# Patient Record
(demographics unavailable — no encounter records)

---

## 2024-12-11 NOTE — HISTORY OF PRESENT ILLNESS
[FreeTextEntry1] : Follow up. [de-identified] : Patient is a 69yo female with PMH HLD, IFG, MARCY, osteoporosis, thyroid nodules who presents to the office for follow up.   HLD:  , ASCVD risk 8% in 06/2024.  Recommended statin versus increased diet/exercise efforts, pt deferred statin at that time.   Fasting glucose 93, A1C 5.7% in 06/2024.  Lifestyle modifications encouraged.   MCV elevated, B12 WNL in 06/2024.   Cardiology:  Dr. Altamirano.   Hx MARCY, is on CPAP (but does not tolerate well).  Sleep specialist considering BiPAP.  Hx osteoporosis previously on Risendronate.  Has been off x1 year.  Has f/u upcoming with specialist from [facility], Dr. Lorenzo.

## 2024-12-11 NOTE — ASSESSMENT
[FreeTextEntry1] : Patient is a 69yo female with PMH HLD, IFG, AMRCY, osteoporosis, thyroid nodules who presents to the office for follow up.  HLD - Pt not fasting today, RX for b/w provided. - Limit/avoid greasy foods, fried foods, fatty foods, and saturated fat in your diet and try and eat more lean proteins. - Try and incorporate 30 minutes of aerobic exercise to your daily routine.  Hx MARCY, currently on CPAP, following with sleep specialist.  Pt also following with [facility], Dr. Lorenzo, for osteoporosis.  Has been off Risendronate x1 year.  Hx multiple thyroid nodules, most recent scan 08/2024.  Plan to rpt in 1 year.  Call the office or go to the ED immediately if you develop new, worsening or concerning symptoms including high fever, severe headache/worst headache of your life, confusion, dizziness/lightheadedness, loss of consciousness, severe chest pain, difficulty breathing, shortness of breath, severe abdominal pain, excessive vomiting/diarrhea, inability to feel/move the extremities, or any other concerning symptoms.

## 2024-12-11 NOTE — HEALTH RISK ASSESSMENT
[Yes] : Yes [2 - 3 times a week (3 pts)] : 2 - 3  times a week (3 points) [1 or 2 (0 pts)] : 1 or 2 (0 points) [Never (0 pts)] : Never (0 points) [No] : In the past 12 months have you used drugs other than those required for medical reasons? No [No falls in past year] : Patient reported no falls in the past year [0] : 2) Feeling down, depressed, or hopeless: Not at all (0) [PHQ-2 Negative - No further assessment needed] : PHQ-2 Negative - No further assessment needed [Former] : Former [> 15 Years] : > 15 Years [Audit-CScore] : 3 [de-identified] : marijuana, gummies at night as needed [de-identified] : exercises 4x/wk pilates [de-identified] : room for improvement; is good sometimes but sometimes bad [[id number]] : 0

## 2025-05-23 NOTE — ASSESSMENT
[FreeTextEntry1] : skin erosion possible traumatic given few weeks, trial of wound care (vaseline/mupirocin) under a bandaid reassess in 6 weeks - if not resolving, biopsy  Actinic keratoses, right nasal side wall  - Natural history reviewed including small risk of transformation to squamous cell carcinoma over time  - Patient instructed to call for re-evaluation if lesion does not resolve in 4-6 weeks Procedure note: Cryotherapy  Verbal consent obtained. Risks/benefits/alternatives discussed including but not limited to risk of pain, inflammatory and blistering reaction, infection, risk of permanent scar and/or dyspigmentation, recurrence, possible lack of efficacy and need for repeat treatments. Site confirmed.  lesion(s) treated with cryotherapy: liquid nitrogen x 2 rapid freeze-slow thaw cycles. Discussed wound care instructions. Patient tolerated well with no immediate complications.  Benign appearing nevi Cherry angiomas Seborrheic keratoses - benign, reassurance, no intervention needed unless irritated   - TBSE performed today - as above - TBSE every year with dermatologist - Photoprotection reviewed including sun-protective behaviors, protective clothing, and the use of OTC broad-spectrum SPF 30+ sunscreens was advised - RTC if develops lesions that are new, symptomatic (bleeding/itching), changing in size/color/shape particularly in areas of prior sun exposure (face, hands, chest)

## 2025-05-23 NOTE — HISTORY OF PRESENT ILLNESS
[FreeTextEntry1] : skin growth  [de-identified] : hx of AK on nose biopsied by prior dermatologist family hx of NMSC  has rough spot on right aspect of nose also with open sore on left chest for few weeks

## 2025-05-23 NOTE — PHYSICAL EXAM
[FreeTextEntry3] : AAOx3, NAD, well-appearing / pleasant Total body skin exam performed with the exception of:  limited genital/groin evaluation, limited scalp evaluation All examined areas within normal limits with the exception of  stuck on brown papules on trunk and extremities hyperkeratotic papule on right nasal sidewall brown macules and papules on trunk and extremities with no concerning features on dermoscopy erosion on left chest red papules on trunk and extremities

## 2025-07-09 NOTE — HISTORY OF PRESENT ILLNESS
[FreeTextEntry1] : CHAIM GARCES is a 69 year old female here for a physical exam.  [de-identified] : Her last physical exam was last year  Vaccines: Tetanus is up to date, Tdap 4/2019 Pneumococcal vaccination is up to date Shingrix is up to date  Her last dentist visit was less than one year ago Her last eye doctor appointment was less than one year ago Her last dermatologist visit was less than one year ago, 5/2025 Dr. Rocha  GYN visit is up to date, 9/2024 Dr. Houston Mammogram is up to date, 10/2024, stable/benign mammo/US Colon cancer screening is up to date, colonoscopy 10/12/2022, rpt due at 5yrs (10/2027), Dr. Kline DEXA is up to date, Later 2024/early 2025 showed -3.1 fem neck (decrease of 2.4% from prior study). Given worsening osteoporosis, was off Risedronate therapy as of 1/2024, followed by Dr. Lorenzo [facility]. She started Prolia Rx with Dr. Lorenzo in Early 2025, next dose due in Aug 2025.  is noticing jaw discomfort and wonders if is due to Prolia and will be f/u with Dr. Lorenzo on that. Plans Prolia x 1-2 yrs and then transition to Reclast Rx.   Her diet is healthy overall Exercise: Pilates approx 4d/wk, trying to work in more days.   Daughter had twins Oct 2024, two girls, Shane Valdez and Maryjaen Navarro

## 2025-07-09 NOTE — PHYSICAL EXAM
[No Acute Distress] : no acute distress [Well Nourished] : well nourished [Well Developed] : well developed [Well-Appearing] : well-appearing [Normal Sclera/Conjunctiva] : normal sclera/conjunctiva [EOMI] : extraocular movements intact [Normal Outer Ear/Nose] : the outer ears and nose were normal in appearance [Normal Oropharynx] : the oropharynx was normal [Normal TMs] : both tympanic membranes were normal [Normal Nasal Mucosa] : the nasal mucosa was normal [No JVD] : no jugular venous distention [No Lymphadenopathy] : no lymphadenopathy [Supple] : supple [Thyroid Normal, No Nodules] : the thyroid was normal and there were no nodules present [No Respiratory Distress] : no respiratory distress  [No Accessory Muscle Use] : no accessory muscle use [Clear to Auscultation] : lungs were clear to auscultation bilaterally [Normal Rate] : normal rate  [Regular Rhythm] : with a regular rhythm [Normal S1, S2] : normal S1 and S2 [No Murmur] : no murmur heard [No Carotid Bruits] : no carotid bruits [No Varicosities] : no varicosities [Pedal Pulses Present] : the pedal pulses are present [No Edema] : there was no peripheral edema [No Extremity Clubbing/Cyanosis] : no extremity clubbing/cyanosis [No Joint Swelling] : no joint swelling [Grossly Normal Strength/Tone] : grossly normal strength/tone [No Rash] : no rash [Coordination Grossly Intact] : coordination grossly intact [No Focal Deficits] : no focal deficits [Normal Gait] : normal gait [Normal Affect] : the affect was normal [Normal Insight/Judgement] : insight and judgment were intact [de-identified] : nontoxic appearance, occas cough noted during visit today (eg when lying supine), speaks in full sentences, no resp distress [de-identified] : lungs CTA B with regular breathing and with forceful cough [de-identified] : no s/sxs acute synovitis, no TTP over greater trochanter/IT bands B, the lump she was intermittently noticing is right proximal quad tendon/ligament and is NT at this time. No inguinal LAD

## 2025-07-09 NOTE — PLAN
[FreeTextEntry1] : Continue all medications as prescribed. Check labs as above. Will adjust any medications based upon lab results.  Reviewed age-appropriate preventive screening tests with patient. UTD on gyn exam, mammogram, colonoscopy and DEXA screening.   Due for thyroid US (f/u nodules)  Reviewed/recommended annual flu vaccine.  She sees a cardiologist regularly and does not need an EKG today.  LDL goal <=100 ideally. Reviewed LDL goal and ASCVD risk estimate and factors that go into this calculation. Reviewed risks/benefits of statin med. Reviewed red yeast rice RYR (600-1200 mg daily) risks/benefits as an alternative to statin meds if not ready to consider statin meds. Recommended excellent hydration +/- co Q10 (100-400 mg daily) to decrease risk for statin (or RYR) related myalgias.  Based on most recent LDL and 10 yr ASCVD risk estimate level would be reasonable to consider statin therapy though given her preference to defer on statin med for now and her excellent HDL and coronary calcium score of zero it is reasonable to defer on statin therapy for now. Consider psyllium husk fiber supplement.  Reviewed diagnosis of impaired fasting glucose (pre-diabetes) and risk for progression to diabetes. Reviewed dietary/lifestyle measures that can help to improve glucose and A1C levels over time and decrease risk for progression to diabetes, including eating cleanly (eg Mediterranean style eating plan), limiting/avoiding white flour carbohydrates and added sugars/sweeteners, pairing proteins with carbohydrates, higher fiber intake in diet, exercising regularly including cardio and strength training, achieving and maintaining a normal/near normal weight/BMI etc. We will monitor glucose and HgbA1C trends over time.  Revd risks of untreated MARCY including increased risk for CV events, elevated BP/labile HTN, increased risk for sudden death and accidents, increased risk for arrhythmias, TIA/CVA, insulin resistance, severe fatigue, daytime somnolence, weight gain/difficulty with weight loss etc. Treating sleep apnea optimally will help to improve her energy levels, improve BP, help achieve/maintain a healthier weight, lower CV risk etc.  Recommend caution/avoidance with driving or other activities that require significant focus and concentration until can get MARCY optimally treated. Reviewed/recommended good sleep hygiene, try to avoid sleeping on back (consider use of tennis ball duct taped to T shirt to assist with this), eat cleanly, exercise regularly, try to lose a little weight as even relatively small weight loss (5-10% of body weight) can result in improvement in sleep apnea, monitor BPs etc.  F/u with pain mgmt +/- ortho/spine as needed for back. Cont supp care/healthy back lifestyle measures for back. Move more/sit less, Motion is lotion etc.  Discussed clean eating (eg Mediterranean style plant focused whole food eating plan) and regular exercise/staying as physically active as possible. Include balance exercises and strength training and core strengthening exercises for bone health and to decrease risk for falls.  Recommended Tylenol XS or Arthritis 1-2 pills BID-TID if helpful, ok to use NSAIDs sparingly with food (but revd r/b/se of NSAIDs incl CV, renal and GI and she should limit use as much as possible), regular stretching, heat/ice prn, consider turmeric supplementation, consider CBD cream or oral options, gentle yoga/chair yoga, Pilates, strengthen core muscles, consider chiro and/or massage and/or acupuncture. If these measures are not helpful enough then consider consultation with ortho and/or pain  for further eval and treatment.  Recommended calcium 2048-6926 mg daily ideally mainly or fully from food sources +/- supplement if needed, vit D 1414-4339 IU daily with food that contains fat for better vitamin absorption. Recommended regular weight bearing exercise as well as strength training exercise 2-3+ times per week and balance exercises regularly.  Reviewed importance of good self care (e.g. meditation, yoga, adequate rest, regular exercise, magnesium, clean eating, etc.).  Follow up with specialists as recommended by them. Asked her to have any/all specialists outside [facility] network send consult notes/test results etc to keep me informed.   Follow up for next physical in one year. Schedule RPA visit (chol, IFG etc) in about 6 months and will repeat fasting labs then.

## 2025-07-09 NOTE — ASSESSMENT
[Vaccines Reviewed] : Immunizations reviewed today. Please see immunization details in the vaccine log within the immunization flowsheet.  [FreeTextEntry1] : JUDY GARCES is a 69 year old female here for a physical exam.  She is also here to follow up on medical issues as noted above.  Judy has high cholesterol, IFG, MARCY (is no longer on CPAP as was not well tolerated. Is now on Bipap and is much better tolerated ), osteoporosis, low back pain, chronic cough/mild COPD, thyroid nodules .  She is UTD on follow up and recommended testing with her specialists incl Dr. Lorenzo ([facility], Endo), card Dr. Altamirano, pain mgmt. Dr. Haque, ortho/spine Dr. Kristian Ledesma, Dr. Gunn (pulm, does not need regular follow up), Dr. Rangel (hematology, only requires prn f/u at this time as hematology eval was benign 2023)  Labs at her 1/2025 visit notable for HDL 77 (65),  (156), A1C 5.7 (5.7). Her 10 year ASCVD risk is calculated at 7.4% .  Given current LDL and 10 yr ASCVD risk estimate level could consider statin therapy (as has FH CAD) though might also still be reasonable to defer for now (bridget as had CT coronary calcium score of zero in 12/2023).   She had updated card eval with Dr. Altamirano in May 2025 and Pravastatin 20 mg daily was Rx'ed. She is tolerating well. Has d/w card option to d/c statin in future if can stick with very clean eating and they are keeping that option open for her. Also started statin in light of was starting Prolia therapy which can raise chol levels.    Recommend Mediterranean style plant based eating and regular exercise +/- can consider use of psyllium husk fiber supplement (eg Metamucil etc).   5 minutes was spent on this discussion and assessment with the patient.  Has h/o multiple thyroid nodules, due for repeat thyroid US. last done [facility] Aug 2024. Rx given

## 2025-07-09 NOTE — HEALTH RISK ASSESSMENT
[No falls in past year] : Patient reported no falls in the past year [0] : 2) Feeling down, depressed, or hopeless: Not at all (0) [PHQ-2 Negative - No further assessment needed] : PHQ-2 Negative - No further assessment needed [Former] : Former [15-19] : 15-19 [> 15 Years] : > 15 Years [[id number]] : 0

## 2025-07-09 NOTE — HISTORY OF PRESENT ILLNESS
[FreeTextEntry1] : CHAIM GARCES is a 69 year old female here for a physical exam.  [de-identified] : Her last physical exam was last year  Vaccines: Tetanus is up to date, Tdap 4/2019 Pneumococcal vaccination is up to date Shingrix is up to date  Her last dentist visit was less than one year ago Her last eye doctor appointment was less than one year ago Her last dermatologist visit was less than one year ago, 5/2025 Dr. Rocha  GYN visit is up to date, 9/2024 Dr. Houston Mammogram is up to date, 10/2024, stable/benign mammo/US Colon cancer screening is up to date, colonoscopy 10/12/2022, rpt due at 5yrs (10/2027), Dr. Kline DEXA is up to date, Later 2024/early 2025 showed -3.1 fem neck (decrease of 2.4% from prior study). Given worsening osteoporosis, was off Risedronate therapy as of 1/2024, followed by Dr. Lorenzo [facility]. She started Prolia Rx with Dr. Lorenzo in Early 2025, next dose due in Aug 2025.  is noticing jaw discomfort and wonders if is due to Prolia and will be f/u with Dr. Lorenzo on that. Plans Prolia x 1-2 yrs and then transition to Reclast Rx.   Her diet is healthy overall Exercise: Pilates approx 4d/wk, trying to work in more days.   Daughter had twins Oct 2024, two girls, Shane Valdez and Maryjane Navarro

## 2025-07-09 NOTE — HEALTH RISK ASSESSMENT
[No falls in past year] : Patient reported no falls in the past year [0] : 2) Feeling down, depressed, or hopeless: Not at all (0) [PHQ-2 Negative - No further assessment needed] : PHQ-2 Negative - No further assessment needed [Former] : Former [15-19] : 15-19 [> 15 Years] : > 15 Years [[id number]] : 0

## 2025-07-09 NOTE — PHYSICAL EXAM
[No Acute Distress] : no acute distress [Well Nourished] : well nourished [Well Developed] : well developed [Well-Appearing] : well-appearing [Normal Sclera/Conjunctiva] : normal sclera/conjunctiva [EOMI] : extraocular movements intact [Normal Outer Ear/Nose] : the outer ears and nose were normal in appearance [Normal Oropharynx] : the oropharynx was normal [Normal TMs] : both tympanic membranes were normal [Normal Nasal Mucosa] : the nasal mucosa was normal [No JVD] : no jugular venous distention [No Lymphadenopathy] : no lymphadenopathy [Supple] : supple [Thyroid Normal, No Nodules] : the thyroid was normal and there were no nodules present [No Respiratory Distress] : no respiratory distress  [No Accessory Muscle Use] : no accessory muscle use [Clear to Auscultation] : lungs were clear to auscultation bilaterally [Normal Rate] : normal rate  [Regular Rhythm] : with a regular rhythm [Normal S1, S2] : normal S1 and S2 [No Murmur] : no murmur heard [No Carotid Bruits] : no carotid bruits [No Varicosities] : no varicosities [Pedal Pulses Present] : the pedal pulses are present [No Edema] : there was no peripheral edema [No Extremity Clubbing/Cyanosis] : no extremity clubbing/cyanosis [No Joint Swelling] : no joint swelling [Grossly Normal Strength/Tone] : grossly normal strength/tone [No Rash] : no rash [Coordination Grossly Intact] : coordination grossly intact [No Focal Deficits] : no focal deficits [Normal Gait] : normal gait [Normal Affect] : the affect was normal [Normal Insight/Judgement] : insight and judgment were intact [de-identified] : nontoxic appearance, occas cough noted during visit today (eg when lying supine), speaks in full sentences, no resp distress [de-identified] : lungs CTA B with regular breathing and with forceful cough [de-identified] : no s/sxs acute synovitis, no TTP over greater trochanter/IT bands B, the lump she was intermittently noticing is right proximal quad tendon/ligament and is NT at this time. No inguinal LAD

## 2025-07-09 NOTE — REVIEW OF SYSTEMS
[Night Sweats] : night sweats [Postnasal Drip] : postnasal drip [Cough] : cough [Dyspnea on Exertion] : dyspnea on exertion [Joint Pain] : joint pain [Joint Stiffness] : joint stiffness [Back Pain] : back pain [Nail Changes] : nail changes [Negative] : Heme/Lymph [Fever] : no fever [Chills] : no chills [Fatigue] : no fatigue [Recent Change In Weight] : ~T no recent weight change [Nasal Discharge] : no nasal discharge [Sore Throat] : no sore throat [Shortness Of Breath] : no shortness of breath [Wheezing] : no wheezing [Itching] : no itching [Mole Changes] : no mole changes [Hair Changes] : no hair changes [Skin Rash] : no skin rash [FreeTextEntry4] : nasal rhinorrhea (clear) and post nasal drip chronically x yrs, +globus sensation, saw EN[facility] recently and Rx'ed nasal spray and oral med for cough/drip [FreeTextEntry6] : +MARCY, chronic intermittent cough, using nasal saline spray, see above [FreeTextEntry9] : OA related joint achiness and stiffness that is generally manageable with regular exercise/keeping active [de-identified] : ridges on some of her toenails, stopped using nail polish, we will check H/H and if abnl will add on iron studies, recommended defer on polish and let nails breathe

## 2025-07-09 NOTE — REVIEW OF SYSTEMS
[Night Sweats] : night sweats [Postnasal Drip] : postnasal drip [Cough] : cough [Dyspnea on Exertion] : dyspnea on exertion [Joint Pain] : joint pain [Joint Stiffness] : joint stiffness [Back Pain] : back pain [Nail Changes] : nail changes [Negative] : Heme/Lymph [Fever] : no fever [Chills] : no chills [Fatigue] : no fatigue [Recent Change In Weight] : ~T no recent weight change [Nasal Discharge] : no nasal discharge [Sore Throat] : no sore throat [Shortness Of Breath] : no shortness of breath [Wheezing] : no wheezing [Itching] : no itching [Mole Changes] : no mole changes [Hair Changes] : no hair changes [Skin Rash] : no skin rash [FreeTextEntry4] : nasal rhinorrhea (clear) and post nasal drip chronically x yrs, +globus sensation, saw EN[facility] recently and Rx'ed nasal spray and oral med for cough/drip [FreeTextEntry6] : +MARCY, chronic intermittent cough, using nasal saline spray, see above [FreeTextEntry9] : OA related joint achiness and stiffness that is generally manageable with regular exercise/keeping active [de-identified] : ridges on some of her toenails, stopped using nail polish, we will check H/H and if abnl will add on iron studies, recommended defer on polish and let nails breathe

## 2025-07-09 NOTE — PLAN
[FreeTextEntry1] : Continue all medications as prescribed. Check labs as above. Will adjust any medications based upon lab results.  Reviewed age-appropriate preventive screening tests with patient. UTD on gyn exam, mammogram, colonoscopy and DEXA screening.   Due for thyroid US (f/u nodules)  Reviewed/recommended annual flu vaccine.  She sees a cardiologist regularly and does not need an EKG today.  LDL goal <=100 ideally. Reviewed LDL goal and ASCVD risk estimate and factors that go into this calculation. Reviewed risks/benefits of statin med. Reviewed red yeast rice RYR (600-1200 mg daily) risks/benefits as an alternative to statin meds if not ready to consider statin meds. Recommended excellent hydration +/- co Q10 (100-400 mg daily) to decrease risk for statin (or RYR) related myalgias.  Based on most recent LDL and 10 yr ASCVD risk estimate level would be reasonable to consider statin therapy though given her preference to defer on statin med for now and her excellent HDL and coronary calcium score of zero it is reasonable to defer on statin therapy for now. Consider psyllium husk fiber supplement.  Reviewed diagnosis of impaired fasting glucose (pre-diabetes) and risk for progression to diabetes. Reviewed dietary/lifestyle measures that can help to improve glucose and A1C levels over time and decrease risk for progression to diabetes, including eating cleanly (eg Mediterranean style eating plan), limiting/avoiding white flour carbohydrates and added sugars/sweeteners, pairing proteins with carbohydrates, higher fiber intake in diet, exercising regularly including cardio and strength training, achieving and maintaining a normal/near normal weight/BMI etc. We will monitor glucose and HgbA1C trends over time.  Revd risks of untreated MARCY including increased risk for CV events, elevated BP/labile HTN, increased risk for sudden death and accidents, increased risk for arrhythmias, TIA/CVA, insulin resistance, severe fatigue, daytime somnolence, weight gain/difficulty with weight loss etc. Treating sleep apnea optimally will help to improve her energy levels, improve BP, help achieve/maintain a healthier weight, lower CV risk etc.  Recommend caution/avoidance with driving or other activities that require significant focus and concentration until can get MARCY optimally treated. Reviewed/recommended good sleep hygiene, try to avoid sleeping on back (consider use of tennis ball duct taped to T shirt to assist with this), eat cleanly, exercise regularly, try to lose a little weight as even relatively small weight loss (5-10% of body weight) can result in improvement in sleep apnea, monitor BPs etc.  F/u with pain mgmt +/- ortho/spine as needed for back. Cont supp care/healthy back lifestyle measures for back. Move more/sit less, Motion is lotion etc.  Discussed clean eating (eg Mediterranean style plant focused whole food eating plan) and regular exercise/staying as physically active as possible. Include balance exercises and strength training and core strengthening exercises for bone health and to decrease risk for falls.  Recommended Tylenol XS or Arthritis 1-2 pills BID-TID if helpful, ok to use NSAIDs sparingly with food (but revd r/b/se of NSAIDs incl CV, renal and GI and she should limit use as much as possible), regular stretching, heat/ice prn, consider turmeric supplementation, consider CBD cream or oral options, gentle yoga/chair yoga, Pilates, strengthen core muscles, consider chiro and/or massage and/or acupuncture. If these measures are not helpful enough then consider consultation with ortho and/or pain  for further eval and treatment.  Recommended calcium 2455-5855 mg daily ideally mainly or fully from food sources +/- supplement if needed, vit D 8703-5954 IU daily with food that contains fat for better vitamin absorption. Recommended regular weight bearing exercise as well as strength training exercise 2-3+ times per week and balance exercises regularly.  Reviewed importance of good self care (e.g. meditation, yoga, adequate rest, regular exercise, magnesium, clean eating, etc.).  Follow up with specialists as recommended by them. Asked her to have any/all specialists outside [facility] network send consult notes/test results etc to keep me informed.   Follow up for next physical in one year. Schedule RPA visit (chol, IFG etc) in about 6 months and will repeat fasting labs then.

## 2025-07-13 NOTE — ASSESSMENT
[FreeTextEntry1] : # Neoplasm of uncertain behavior- left chest   ddx ISK vs other  Procedure Note: Biopsy left chest   The risks/benefits/alternatives of skin biopsy were explained to the patient, which include and are not limited to bleeding, infection, scarring or discoloration of skin, and recurrence of lesion. Patient expressed understanding of these risks and provided consent to the procedure. Time out with verification of patient and lesion site was performed. Site was prepped with rubbing alcohol, lidocaine with epinephrine was injected for anesthesia, and biopsy was performed. Specimen sent to path. Procedure was without complication and well tolerated. Wound care was discussed.  #Actinic keratoses, left nasal side wall  - Natural history reviewed including small risk of transformation to squamous cell carcinoma over time  - Patient instructed to call for re-evaluation if lesion does not resolve in 4-6 weeks Procedure note: Cryotherapy x 1  Verbal consent obtained. Risks/benefits/alternatives discussed including but not limited to risk of pain, inflammatory and blistering reaction, infection, risk of permanent scar and/or dyspigmentation, recurrence, possible lack of efficacy and need for repeat treatments. Site confirmed.  lesion(s) treated with cryotherapy: liquid nitrogen x 2 rapid freeze-slow thaw cycles. Discussed wound care instructions. Patient tolerated well with no immediate complications.  Photoaging, face - tretinoin 0.05% cream QHS.

## 2025-07-13 NOTE — HISTORY OF PRESENT ILLNESS
[FreeTextEntry1] : skin growth  [de-identified] : 69 year old female presents to clinic for rough spot on note  1. Rough spot on L nose - recently came out of nowhere. Notes having hx of AK on nose, bx previously by prior dermatologist.   2. Notes that the open sore on L chest has healed completely however does not recall if the lesion has changed shape or size. Requesting bx given family hx of NMSC. Has applied vaseline to area few times.   3. Requesting refill of tretinoin 0.05%.   family hx of NMSC

## 2025-07-13 NOTE — HISTORY OF PRESENT ILLNESS
[FreeTextEntry1] : skin growth  [de-identified] : 69 year old female presents to clinic for rough spot on note  1. Rough spot on L nose - recently came out of nowhere. Notes having hx of AK on nose, bx previously by prior dermatologist.   2. Notes that the open sore on L chest has healed completely however does not recall if the lesion has changed shape or size. Requesting bx given family hx of NMSC. Has applied vaseline to area few times.   3. Requesting refill of tretinoin 0.05%.   family hx of NMSC

## 2025-07-13 NOTE — PHYSICAL EXAM
[FreeTextEntry3] : AAOx3, NAD, well-appearing / pleasant Limited skin exam performed:   hyperkeratotic papule on left nasal sidewall L chest wall: brown macule with noted darker pigment on periphery on dermoscopy 
[FreeTextEntry3] : AAOx3, NAD, well-appearing / pleasant Limited skin exam performed:   hyperkeratotic papule on left nasal sidewall L chest wall: brown macule with noted darker pigment on periphery on dermoscopy 
Home
no

## 2025-07-30 NOTE — ASSESSMENT
[FreeTextEntry1] : Mild lymphocytosis in a healthy 67-year-old woman who is brother and second cousin have both been diagnosed with CLL.\par  Flow cytometry was sent from our office today to investigate the possibility of a small monoclonal B-cell population.\par  I will contact her and her physicians once results are known.

## 2025-07-30 NOTE — HISTORY OF PRESENT ILLNESS
[de-identified] : This healthy 67-year-old woman was referred for evaluation of mild lymphocytosis.  In November 2022 and again in January 2023 she ran WBC between 8.4 and 9.5, with differential showing 47 neutrophils and 45 lymphocytes.  Her total lymphocyte count was recorded as 4000.\par  In our office today the white count is 8.59, hemoglobin and hematocrit are normal.  The differential shows 47 neutrophils 45 lymphocytes 5 monocytes.\par  The family history is pertinent for CLL in her brother, alive at 63, and a second cousin.

## 2025-07-30 NOTE — REVIEW OF SYSTEMS
Subjective:      Patient ID: Andre Jimenez is a 44 y.o. male.    Chief Complaint: Post-op Evaluation of the Left Hand    HPI patient is 2 weeks postop left carpal tunnel release and cubital tunnel release.  He is asymptomatic.    ROS unchanged from prior visit      Objective:      Incisions are clean.  There is no swelling or drainage.      Ortho/SPM Exam            Assessment:       Encounter Diagnosis   Name Primary?    Bilateral carpal tunnel syndrome Yes          Plan:       Andre was seen today for post-op evaluation.    Diagnoses and all orders for this visit:    Bilateral carpal tunnel syndrome    Sutures removed today.  He is instructed in massaging his scars.  Return p.r.n.    He is given a work release for 4 September           [Negative] : Allergic/Immunologic

## 2025-07-30 NOTE — HISTORY OF PRESENT ILLNESS
[de-identified] : This healthy 67-year-old woman was referred for evaluation of mild lymphocytosis.  In November 2022 and again in January 2023 she ran WBC between 8.4 and 9.5, with differential showing 47 neutrophils and 45 lymphocytes.  Her total lymphocyte count was recorded as 4000.\par  In our office today the white count is 8.59, hemoglobin and hematocrit are normal.  The differential shows 47 neutrophils 45 lymphocytes 5 monocytes.\par  The family history is pertinent for CLL in her brother, alive at 63, and a second cousin.